# Patient Record
Sex: MALE | Race: WHITE | NOT HISPANIC OR LATINO | Employment: OTHER | URBAN - METROPOLITAN AREA
[De-identification: names, ages, dates, MRNs, and addresses within clinical notes are randomized per-mention and may not be internally consistent; named-entity substitution may affect disease eponyms.]

---

## 2019-02-15 NOTE — PATIENT DISCUSSION
EYELID LESION, OD:  CHARACTERISTICS CONSISTENT WITH HIDROCYSTOMA . RECOMMEND BIOPSY WITH SPECIMEN TO PATHOLOGY. RISKS/BENEFITS/ALTERNATIVES DISCUSSED WITH PATIENT.

## 2019-02-15 NOTE — PATIENT DISCUSSION
New Prescription: erythromycin (erythromycin): ointment: 5 mg/gram (0.5 %) a small amount at bedtime as needed into right eye 02-

## 2022-09-01 ENCOUNTER — NEW PATIENT (OUTPATIENT)
Dept: URBAN - METROPOLITAN AREA CLINIC 37 | Facility: CLINIC | Age: 87
End: 2022-09-01

## 2025-01-06 ENCOUNTER — NEW PATIENT (OUTPATIENT)
Age: OVER 89
End: 2025-01-06

## 2025-01-06 DIAGNOSIS — H43.822: ICD-10-CM

## 2025-01-06 DIAGNOSIS — H35.3131: ICD-10-CM

## 2025-01-06 DIAGNOSIS — H52.203: ICD-10-CM

## 2025-01-06 DIAGNOSIS — H43.813: ICD-10-CM

## 2025-01-06 PROCEDURE — 92134 CPTRZ OPH DX IMG PST SGM RTA: CPT

## 2025-01-06 PROCEDURE — 92004 COMPRE OPH EXAM NEW PT 1/>: CPT

## 2025-01-06 PROCEDURE — 92015 DETERMINE REFRACTIVE STATE: CPT
